# Patient Record
Sex: MALE | Race: OTHER | URBAN - METROPOLITAN AREA
[De-identification: names, ages, dates, MRNs, and addresses within clinical notes are randomized per-mention and may not be internally consistent; named-entity substitution may affect disease eponyms.]

---

## 2020-12-09 ENCOUNTER — OFFICE VISIT (OUTPATIENT)
Dept: URBAN - METROPOLITAN AREA CLINIC 115 | Facility: CLINIC | Age: 80
End: 2020-12-09
Payer: COMMERCIAL

## 2020-12-09 ENCOUNTER — LAB OUTSIDE AN ENCOUNTER (OUTPATIENT)
Dept: URBAN - METROPOLITAN AREA CLINIC 115 | Facility: CLINIC | Age: 80
End: 2020-12-09

## 2020-12-09 ENCOUNTER — TELEPHONE ENCOUNTER (OUTPATIENT)
Dept: URBAN - METROPOLITAN AREA CLINIC 115 | Facility: CLINIC | Age: 80
End: 2020-12-09

## 2020-12-09 DIAGNOSIS — R14.2 BELCHING: ICD-10-CM

## 2020-12-09 DIAGNOSIS — R10.11 RUQ PAIN: ICD-10-CM

## 2020-12-09 DIAGNOSIS — R10.13 DYSPEPSIA: ICD-10-CM

## 2020-12-09 PROCEDURE — G8427 DOCREV CUR MEDS BY ELIG CLIN: HCPCS | Performed by: INTERNAL MEDICINE

## 2020-12-09 PROCEDURE — G8482 FLU IMMUNIZE ORDER/ADMIN: HCPCS | Performed by: INTERNAL MEDICINE

## 2020-12-09 PROCEDURE — G8420 CALC BMI NORM PARAMETERS: HCPCS | Performed by: INTERNAL MEDICINE

## 2020-12-09 PROCEDURE — 99203 OFFICE O/P NEW LOW 30 MIN: CPT | Performed by: INTERNAL MEDICINE

## 2020-12-09 PROCEDURE — 4004F PT TOBACCO SCREEN RCVD TLK: CPT | Performed by: INTERNAL MEDICINE

## 2020-12-09 RX ORDER — ATORVASTATIN CALCIUM 10 MG/1
1 TABLET TABLET, FILM COATED ORAL ONCE A DAY
Status: ACTIVE | COMMUNITY

## 2020-12-09 RX ORDER — ASCORBIC ACID 100 MG
1 TABLET TABLET,CHEWABLE ORAL ONCE A DAY
Status: ACTIVE | COMMUNITY

## 2020-12-09 RX ORDER — LOSARTAN POTASSIUM 100 MG/1
1 TABLET TABLET ORAL ONCE A DAY
Status: ACTIVE | COMMUNITY

## 2020-12-09 RX ORDER — AMLODIPINE BESYLATE 5 MG/1
1 TABLET TABLET ORAL ONCE A DAY
Status: ACTIVE | COMMUNITY

## 2020-12-09 RX ORDER — PANTOPRAZOLE SODIUM 20 MG/1
1 TABLET TABLET, DELAYED RELEASE ORAL ONCE A DAY
Status: ACTIVE | COMMUNITY

## 2020-12-09 NOTE — HPI-TODAY'S VISIT:
81 yo AAM here for c/o RUQ and chest pain and discomfort. He had these symptoms going off an on for 1 year. It is intermittent and no obvious triggers or relieving factors. Takes tylenol for pain. He c/o severe belching and burping. PCP has rx pantoprazole and it has helped partially but did not continue after 2 weeks as he had concerns for side effects.  last colonsocopy was 5 years ago and it was unremarkable.

## 2020-12-14 ENCOUNTER — CLAIMS CREATED FROM THE CLAIM WINDOW (OUTPATIENT)
Dept: URBAN - METROPOLITAN AREA CLINIC 4 | Facility: CLINIC | Age: 80
End: 2020-12-14
Payer: COMMERCIAL

## 2020-12-14 ENCOUNTER — OFFICE VISIT (OUTPATIENT)
Dept: URBAN - METROPOLITAN AREA SURGERY CENTER 13 | Facility: SURGERY CENTER | Age: 80
End: 2020-12-14
Payer: COMMERCIAL

## 2020-12-14 ENCOUNTER — TELEPHONE ENCOUNTER (OUTPATIENT)
Dept: URBAN - METROPOLITAN AREA CLINIC 92 | Facility: CLINIC | Age: 80
End: 2020-12-14

## 2020-12-14 DIAGNOSIS — K29.60 OTHER GASTRITIS WITHOUT BLEEDING: ICD-10-CM

## 2020-12-14 DIAGNOSIS — K30 ACID INDIGESTION: ICD-10-CM

## 2020-12-14 DIAGNOSIS — K29.60 ADENOPAPILLOMATOSIS GASTRICA: ICD-10-CM

## 2020-12-14 DIAGNOSIS — B96.81 BACTERIAL INFECTION DUE TO H. PYLORI: ICD-10-CM

## 2020-12-14 DIAGNOSIS — B96.81 HELICOBACTER PYLORI [H. PYLORI] AS THE CAUSE OF DISEASES CLASSIFIED ELSEWHERE: ICD-10-CM

## 2020-12-14 PROCEDURE — 88305 TISSUE EXAM BY PATHOLOGIST: CPT | Performed by: PATHOLOGY

## 2020-12-14 PROCEDURE — 88342 IMHCHEM/IMCYTCHM 1ST ANTB: CPT | Performed by: PATHOLOGY

## 2020-12-14 PROCEDURE — G8907 PT DOC NO EVENTS ON DISCHARG: HCPCS | Performed by: INTERNAL MEDICINE

## 2020-12-14 PROCEDURE — 43239 EGD BIOPSY SINGLE/MULTIPLE: CPT | Performed by: INTERNAL MEDICINE

## 2020-12-14 RX ORDER — ATORVASTATIN CALCIUM 10 MG/1
1 TABLET TABLET, FILM COATED ORAL ONCE A DAY
Status: ACTIVE | COMMUNITY

## 2020-12-14 RX ORDER — ASCORBIC ACID 100 MG
1 TABLET TABLET,CHEWABLE ORAL ONCE A DAY
Status: ACTIVE | COMMUNITY

## 2020-12-14 RX ORDER — PANTOPRAZOLE SODIUM 20 MG/1
1 TABLET TABLET, DELAYED RELEASE ORAL ONCE A DAY
Status: ACTIVE | COMMUNITY

## 2020-12-14 RX ORDER — LOSARTAN POTASSIUM 100 MG/1
1 TABLET TABLET ORAL ONCE A DAY
Status: ACTIVE | COMMUNITY

## 2020-12-14 RX ORDER — PANTOPRAZOLE 40 MG/1
1 TABLET TABLET, DELAYED RELEASE ORAL ONCE A DAY
Qty: 90 TABLET | Refills: 1 | OUTPATIENT
Start: 2020-12-14

## 2020-12-14 RX ORDER — AMLODIPINE BESYLATE 5 MG/1
1 TABLET TABLET ORAL ONCE A DAY
Status: ACTIVE | COMMUNITY

## 2020-12-21 ENCOUNTER — TELEPHONE ENCOUNTER (OUTPATIENT)
Dept: URBAN - METROPOLITAN AREA CLINIC 92 | Facility: CLINIC | Age: 80
End: 2020-12-21

## 2020-12-21 RX ORDER — CLARITHROMYCIN 500 MG/1
1 TABLET,HOLD STATINS FOR 2 WEEKS TABLET, FILM COATED ORAL
Qty: 28 TABLET | Refills: 0 | OUTPATIENT
Start: 2020-12-21 | End: 2021-01-04

## 2020-12-21 RX ORDER — AMOXICILLIN 500 MG/1
2 CAPSULES CAPSULE ORAL TWICE A DAY
Qty: 56 CAPSULE | Refills: 0 | OUTPATIENT
Start: 2020-12-21 | End: 2021-01-04

## 2021-05-19 ENCOUNTER — OFFICE VISIT (OUTPATIENT)
Dept: URBAN - METROPOLITAN AREA CLINIC 115 | Facility: CLINIC | Age: 81
End: 2021-05-19
Payer: COMMERCIAL

## 2021-05-19 ENCOUNTER — WEB ENCOUNTER (OUTPATIENT)
Dept: URBAN - METROPOLITAN AREA CLINIC 115 | Facility: CLINIC | Age: 81
End: 2021-05-19

## 2021-05-19 ENCOUNTER — LAB OUTSIDE AN ENCOUNTER (OUTPATIENT)
Dept: URBAN - METROPOLITAN AREA CLINIC 115 | Facility: CLINIC | Age: 81
End: 2021-05-19

## 2021-05-19 DIAGNOSIS — D50.0 IRON DEFICIENCY ANEMIA DUE TO CHRONIC BLOOD LOSS: ICD-10-CM

## 2021-05-19 DIAGNOSIS — D13.5 ADENOMYOMATOSIS OF GALLBLADDER: ICD-10-CM

## 2021-05-19 DIAGNOSIS — R10.11 RUQ PAIN: ICD-10-CM

## 2021-05-19 DIAGNOSIS — K86.1 CHRONIC PANCREATITIS, UNSPECIFIED PANCREATITIS TYPE: ICD-10-CM

## 2021-05-19 PROBLEM — 235494005: Status: ACTIVE | Noted: 2021-05-19

## 2021-05-19 PROBLEM — 162031009: Status: ACTIVE | Noted: 2020-12-09

## 2021-05-19 PROBLEM — 80774000 HELICOBACTER PYLORI: Status: ACTIVE | Noted: 2021-05-19

## 2021-05-19 PROCEDURE — 99214 OFFICE O/P EST MOD 30 MIN: CPT | Performed by: INTERNAL MEDICINE

## 2021-05-19 RX ORDER — ASCORBIC ACID 100 MG
1 TABLET TABLET,CHEWABLE ORAL ONCE A DAY
Status: ACTIVE | COMMUNITY

## 2021-05-19 RX ORDER — PANTOPRAZOLE 40 MG/1
1 TABLET TABLET, DELAYED RELEASE ORAL ONCE A DAY
Qty: 90 TABLET | Refills: 1 | Status: ACTIVE | COMMUNITY
Start: 2020-12-14

## 2021-05-19 RX ORDER — AMLODIPINE BESYLATE 5 MG/1
1 TABLET TABLET ORAL ONCE A DAY
Status: ACTIVE | COMMUNITY

## 2021-05-19 RX ORDER — PANTOPRAZOLE SODIUM 20 MG/1
1 TABLET TABLET, DELAYED RELEASE ORAL ONCE A DAY
Status: ACTIVE | COMMUNITY

## 2021-05-19 RX ORDER — POLYETHYLENE GLYCOL 3350 17 G/17G
AS DIRECTED POWDER, FOR SOLUTION ORAL
Qty: 238 GRAM | Refills: 0 | OUTPATIENT
Start: 2021-05-19 | End: 2021-05-20

## 2021-05-19 RX ORDER — ATORVASTATIN CALCIUM 10 MG/1
1 TABLET TABLET, FILM COATED ORAL ONCE A DAY
Status: ACTIVE | COMMUNITY

## 2021-05-19 RX ORDER — LOSARTAN POTASSIUM 100 MG/1
1 TABLET TABLET ORAL ONCE A DAY
Status: ACTIVE | COMMUNITY

## 2021-05-19 NOTE — HPI-TODAY'S VISIT:
79 yo AAM here for c/o RUQ and chest pain and discomfort. He had these symptoms going off an on for 1 year. It is intermittent and no obvious triggers or relieving factors. Takes tylenol for pain. He c/o severe belching and burping. PCP has rx pantoprazole and it has helped partially but did not continue after 2 weeks as he had concerns for side effects.  last colonsocopy was 5 years ago and it was unremarkable.  5/19/21:

## 2021-05-19 NOTE — HPI-TODAY'S VISIT:
is here for follow-up.  He is complaining of having right upper quadrant pain and right-sided chest pain which he always have.  Other complaints also includes nausea..  Patient stated his symptoms of nausea are improving since he took PPI.  In December he underwent an upper endoscopy which showed H. pylori erosive gastritis.  And also mild reflux.  Patient had a recent labs showing anemia hemoglobin around 12.  Lites LFTs and lipase was also checked showing elevated lipase.  Patient reports having prior history of elevated lipase in the past and also had a CT scan of the abdomen and ultrasound of the abdomen last year which were reviewed today which showed adenomatosis of the gallbladder wall thickening of the gallbladder wall.  Patient reports having some weight loss.  Denies any diarrhea rectal bleeding or melanotic stools.  He quit taking pantoprazole after 1month.  However he reports being compliant with the PPI and antibiotics at that time.

## 2021-05-20 PROBLEM — 4556007 GASTRITIS: Status: ACTIVE | Noted: 2021-05-20

## 2021-05-22 LAB
A/G RATIO: 1.4
ALBUMIN: 4.2
ALKALINE PHOSPHATASE: 42
ALT (SGPT): 10
AST (SGOT): 16
BILIRUBIN, TOTAL: 0.9
BUN/CREATININE RATIO: (no result)
BUN: 17
CALCIUM: 9.3
CARBON DIOXIDE, TOTAL: 28
CHLORIDE: 105
CREATININE: 1.07
EGFR AFRICAN AMERICAN: 75
EGFR NON-AFR. AMERICAN: 65
GLOBULIN, TOTAL: 3
GLUCOSE: 107
IRON BIND.CAP.(TIBC): 276
IRON SATURATION: 36
IRON: 100
LIPASE: 134
POTASSIUM: 4.2
PROTEIN, TOTAL: 7.2
SODIUM: 141

## 2021-05-28 ENCOUNTER — TELEPHONE ENCOUNTER (OUTPATIENT)
Dept: URBAN - METROPOLITAN AREA CLINIC 92 | Facility: CLINIC | Age: 81
End: 2021-05-28

## 2021-06-03 ENCOUNTER — LAB OUTSIDE AN ENCOUNTER (OUTPATIENT)
Dept: URBAN - METROPOLITAN AREA CLINIC 82 | Facility: CLINIC | Age: 81
End: 2021-06-03

## 2021-06-03 LAB
CREATININE POC: 1.2
PERFORMING LAB: (no result)

## 2021-07-06 ENCOUNTER — OFFICE VISIT (OUTPATIENT)
Dept: URBAN - METROPOLITAN AREA SURGERY CENTER 13 | Facility: SURGERY CENTER | Age: 81
End: 2021-07-06

## 2021-07-06 PROBLEM — 1086791000119100: Status: ACTIVE | Noted: 2021-05-19

## 2021-08-09 ENCOUNTER — OFFICE VISIT (OUTPATIENT)
Dept: URBAN - METROPOLITAN AREA SURGERY CENTER 13 | Facility: SURGERY CENTER | Age: 81
End: 2021-08-09
Payer: COMMERCIAL

## 2021-08-09 ENCOUNTER — TELEPHONE ENCOUNTER (OUTPATIENT)
Dept: URBAN - METROPOLITAN AREA CLINIC 92 | Facility: CLINIC | Age: 81
End: 2021-08-09

## 2021-08-09 ENCOUNTER — LAB OUTSIDE AN ENCOUNTER (OUTPATIENT)
Dept: URBAN - METROPOLITAN AREA CLINIC 92 | Facility: CLINIC | Age: 81
End: 2021-08-09

## 2021-08-09 ENCOUNTER — OFFICE VISIT (OUTPATIENT)
Dept: URBAN - METROPOLITAN AREA SURGERY CENTER 13 | Facility: SURGERY CENTER | Age: 81
End: 2021-08-09

## 2021-08-09 ENCOUNTER — CLAIMS CREATED FROM THE CLAIM WINDOW (OUTPATIENT)
Dept: URBAN - METROPOLITAN AREA CLINIC 4 | Facility: CLINIC | Age: 81
End: 2021-08-09
Payer: COMMERCIAL

## 2021-08-09 DIAGNOSIS — D50.9 ANEMIA, IRON DEFICIENCY: ICD-10-CM

## 2021-08-09 DIAGNOSIS — K63.89 COLONIC PSEUDOMELANOSIS: ICD-10-CM

## 2021-08-09 DIAGNOSIS — K63.5 BENIGN COLON POLYP: ICD-10-CM

## 2021-08-09 DIAGNOSIS — K31.89 ACQUIRED DEFORMITY OF DUODENUM: ICD-10-CM

## 2021-08-09 DIAGNOSIS — K31.89 GASTRIC FOVEOLAR HYPERPLASIA: ICD-10-CM

## 2021-08-09 PROBLEM — 724556004: Status: ACTIVE | Noted: 2021-05-19

## 2021-08-09 PROCEDURE — 43239 EGD BIOPSY SINGLE/MULTIPLE: CPT | Performed by: INTERNAL MEDICINE

## 2021-08-09 PROCEDURE — 88312 SPECIAL STAINS GROUP 1: CPT | Performed by: PATHOLOGY

## 2021-08-09 PROCEDURE — 88305 TISSUE EXAM BY PATHOLOGIST: CPT | Performed by: PATHOLOGY

## 2021-08-09 PROCEDURE — G8907 PT DOC NO EVENTS ON DISCHARG: HCPCS | Performed by: INTERNAL MEDICINE

## 2021-08-09 PROCEDURE — 45380 COLONOSCOPY AND BIOPSY: CPT | Performed by: INTERNAL MEDICINE

## 2021-08-09 RX ORDER — AMLODIPINE BESYLATE 5 MG/1
1 TABLET TABLET ORAL ONCE A DAY
Status: ACTIVE | COMMUNITY

## 2021-08-09 RX ORDER — LOSARTAN POTASSIUM 100 MG/1
1 TABLET TABLET ORAL ONCE A DAY
Status: ACTIVE | COMMUNITY

## 2021-08-09 RX ORDER — ASCORBIC ACID 100 MG
1 TABLET TABLET,CHEWABLE ORAL ONCE A DAY
Status: ACTIVE | COMMUNITY

## 2021-08-09 RX ORDER — ATORVASTATIN CALCIUM 10 MG/1
1 TABLET TABLET, FILM COATED ORAL ONCE A DAY
Status: ACTIVE | COMMUNITY

## 2021-08-09 RX ORDER — PANTOPRAZOLE 40 MG/1
1 TABLET TABLET, DELAYED RELEASE ORAL ONCE A DAY
Qty: 90 TABLET | Refills: 1 | Status: ACTIVE | COMMUNITY
Start: 2020-12-14

## 2021-08-09 RX ORDER — PANTOPRAZOLE SODIUM 20 MG/1
1 TABLET TABLET, DELAYED RELEASE ORAL ONCE A DAY
Status: ACTIVE | COMMUNITY

## 2022-05-04 ENCOUNTER — OFFICE VISIT (OUTPATIENT)
Dept: URBAN - METROPOLITAN AREA CLINIC 115 | Facility: CLINIC | Age: 82
End: 2022-05-04

## 2022-06-15 ENCOUNTER — OFFICE VISIT (OUTPATIENT)
Dept: URBAN - METROPOLITAN AREA CLINIC 115 | Facility: CLINIC | Age: 82
End: 2022-06-15

## 2022-06-15 ENCOUNTER — OFFICE VISIT (OUTPATIENT)
Dept: URBAN - METROPOLITAN AREA CLINIC 115 | Facility: CLINIC | Age: 82
End: 2022-06-15
Payer: COMMERCIAL

## 2022-06-15 ENCOUNTER — LAB OUTSIDE AN ENCOUNTER (OUTPATIENT)
Dept: URBAN - METROPOLITAN AREA CLINIC 115 | Facility: CLINIC | Age: 82
End: 2022-06-15

## 2022-06-15 VITALS
BODY MASS INDEX: 24.75 KG/M2 | SYSTOLIC BLOOD PRESSURE: 119 MMHG | DIASTOLIC BLOOD PRESSURE: 70 MMHG | HEART RATE: 82 BPM | WEIGHT: 145 LBS | TEMPERATURE: 97.1 F | HEIGHT: 64 IN

## 2022-06-15 DIAGNOSIS — D13.5 ADENOMYOMATOSIS OF GALLBLADDER: ICD-10-CM

## 2022-06-15 DIAGNOSIS — R74.8 ELEVATED LIPASE: ICD-10-CM

## 2022-06-15 DIAGNOSIS — R14.2 BELCHING: ICD-10-CM

## 2022-06-15 PROBLEM — 721723003: Status: ACTIVE | Noted: 2021-05-19

## 2022-06-15 PROBLEM — 271834000: Status: ACTIVE | Noted: 2020-12-09

## 2022-06-15 PROCEDURE — 99214 OFFICE O/P EST MOD 30 MIN: CPT | Performed by: INTERNAL MEDICINE

## 2022-06-15 RX ORDER — ATORVASTATIN CALCIUM 10 MG/1
1 TABLET TABLET, FILM COATED ORAL ONCE A DAY
Status: ACTIVE | COMMUNITY

## 2022-06-15 RX ORDER — PANTOPRAZOLE SODIUM 20 MG/1
1 TABLET TABLET, DELAYED RELEASE ORAL ONCE A DAY
Status: ACTIVE | COMMUNITY

## 2022-06-15 RX ORDER — AMLODIPINE BESYLATE 5 MG/1
1 TABLET TABLET ORAL ONCE A DAY
Status: ACTIVE | COMMUNITY

## 2022-06-15 RX ORDER — PANTOPRAZOLE 40 MG/1
1 TABLET TABLET, DELAYED RELEASE ORAL ONCE A DAY
Qty: 90 TABLET | Refills: 1 | Status: ACTIVE | COMMUNITY
Start: 2020-12-14

## 2022-06-15 RX ORDER — LOSARTAN POTASSIUM 100 MG/1
1 TABLET TABLET ORAL ONCE A DAY
Status: ACTIVE | COMMUNITY

## 2022-06-15 RX ORDER — ASCORBIC ACID 100 MG
1 TABLET TABLET,CHEWABLE ORAL ONCE A DAY
Status: ACTIVE | COMMUNITY

## 2022-06-15 NOTE — HPI-TODAY'S VISIT:
is here for follow-up.  He is complaining of having right upper quadrant pain and right-sided chest pain which he always have.  Other complaints also includes nausea..  Patient stated his symptoms of nausea are improving since he took PPI.  In December he underwent an upper endoscopy which showed H. pylori erosive gastritis.  And also mild reflux.  Patient had a recent labs showing anemia hemoglobin around 12.  Lites LFTs and lipase was also checked showing elevated lipase.  Patient reports having prior history of elevated lipase in the past and also had a CT scan of the abdomen and ultrasound of the abdomen last year which were reviewed today which showed adenomatosis of the gallbladder wall thickening of the gallbladder wall.  Patient reports having some weight loss.  Denies any diarrhea rectal bleeding or melanotic stools.  He quit taking pantoprazole after 1month.  However he reports being compliant with the PPI and antibiotics at that time.  6/15/22: Mr. Brennan was seen today for follow-up.  He was sent back by his PCP given his elevated lipase.  Patient is known to have elevated lipase in the range of 101" 134 several times.  Patient denies any epigastric abdominal pain nausea vomiting.  He had an MRI of the abdomen done last year because of the slight elevation of the lipase and also because of the vague epigastric abdominal discomfort and pain.  Bili was unremarkable normal pancreatic lesions were noted no evidence of bile duct lesions no evidence of stones in the common bile duct.  He was noted to have fatty liver.  He has unintentional weight loss about 45 pounds in the past year.  Prior endoscopy and colonoscopy was reviewed.  Patient denies any new medications.  CT scan of the abdomen pelvis from June 2020 was also reviewed.

## 2022-06-15 NOTE — HPI-TODAY'S VISIT:
81 yo AAM here for c/o RUQ and chest pain and discomfort. He had these symptoms going off an on for 1 year. It is intermittent and no obvious triggers or relieving factors. Takes tylenol for pain. He c/o severe belching and burping. PCP has rx pantoprazole and it has helped partially but did not continue after 2 weeks as he had concerns for side effects.  last colonsocopy was 5 years ago and it was unremarkable.  5/19/21:

## 2023-11-07 ENCOUNTER — OFFICE VISIT (OUTPATIENT)
Dept: URBAN - METROPOLITAN AREA CLINIC 115 | Facility: CLINIC | Age: 83
End: 2023-11-07
Payer: COMMERCIAL

## 2023-11-07 VITALS
TEMPERATURE: 98.4 F | SYSTOLIC BLOOD PRESSURE: 128 MMHG | DIASTOLIC BLOOD PRESSURE: 78 MMHG | BODY MASS INDEX: 24.34 KG/M2 | WEIGHT: 142.6 LBS | HEIGHT: 64 IN | HEART RATE: 98 BPM

## 2023-11-07 DIAGNOSIS — D64.89 ANEMIA DUE TO OTHER CAUSE: ICD-10-CM

## 2023-11-07 DIAGNOSIS — K31.9 GASTROPATHY: ICD-10-CM

## 2023-11-07 DIAGNOSIS — R74.8 ELEVATED LIPASE: ICD-10-CM

## 2023-11-07 DIAGNOSIS — R17 ELEVATED BILIRUBIN: ICD-10-CM

## 2023-11-07 PROCEDURE — 99213 OFFICE O/P EST LOW 20 MIN: CPT | Performed by: INTERNAL MEDICINE

## 2023-11-07 RX ORDER — ASCORBIC ACID 100 MG
1 TABLET TABLET,CHEWABLE ORAL ONCE A DAY
Status: ACTIVE | COMMUNITY

## 2023-11-07 RX ORDER — ATORVASTATIN CALCIUM 10 MG/1
1 TABLET TABLET, FILM COATED ORAL ONCE A DAY
Status: ACTIVE | COMMUNITY

## 2023-11-07 RX ORDER — AMLODIPINE BESYLATE 5 MG/1
1 TABLET TABLET ORAL ONCE A DAY
Status: ACTIVE | COMMUNITY

## 2023-11-07 RX ORDER — FAMOTIDINE 40 MG/1
1 TABLET AT BEDTIME TABLET, FILM COATED ORAL ONCE A DAY
Qty: 30 | Refills: 1 | OUTPATIENT
Start: 2023-11-07

## 2023-11-07 RX ORDER — PANTOPRAZOLE SODIUM 20 MG/1
1 TABLET TABLET, DELAYED RELEASE ORAL ONCE A DAY
Status: ACTIVE | COMMUNITY

## 2023-11-07 RX ORDER — PANTOPRAZOLE 40 MG/1
1 TABLET TABLET, DELAYED RELEASE ORAL ONCE A DAY
Qty: 90 TABLET | Refills: 1 | Status: ACTIVE | COMMUNITY
Start: 2020-12-14

## 2023-11-07 RX ORDER — LOSARTAN POTASSIUM 100 MG/1
1 TABLET TABLET ORAL ONCE A DAY
Status: ACTIVE | COMMUNITY

## 2023-11-07 NOTE — HPI-TODAY'S VISIT:
MRI/MRCP '22 w normal liver, bile ducts, pancreas, and GB adenomyomatosis. Pt of Dr. Saavedra. EGD '21 w gastric erosions, gastropathy, no hp. reports hx chronic elevated lipase.bili 1.4, lipase 115, hgb 11.9, mcv 91. No epig pain. Lost ~5lb. concerned re labs.

## 2023-11-08 LAB
ALBUMIN/GLOBULIN RATIO: 1.2
ALBUMIN: 4.3
ALKALINE PHOSPHATASE: 87
ALT (SGPT): 23
AST (SGOT): 29
BILIRUBIN, DIRECT: 0.4
BILIRUBIN, INDIRECT: 0.9
BILIRUBIN, TOTAL: 1.3
BUN/CREATININE RATIO: (no result)
CALCIUM: 9.9
CARBON DIOXIDE: 29
CHLORIDE: 104
CREATININE: 1.11
EGFR: 66
FERRITIN, SERUM: 186
FOLATE (FOLIC ACID), SERUM: 12.9
GLOBULIN: 3.6
GLUCOSE: 99
HEMATOCRIT: 37
HEMOGLOBIN: 12.3
IMMUNOGLOBULIN A, QN, SERUM: 447
INTERPRETATION: (no result)
IRON BIND.CAP.(TIBC): 345
IRON SATURATION: 17
IRON: 57
LIPASE: 87
MCH: 29.4
MCHC: 33.2
MCV: 88.3
MPV: 11
PLATELET COUNT: 166
POTASSIUM: 4.4
PROTEIN, TOTAL: 7.9
RDW: 11.7
RED BLOOD CELL COUNT: 4.19
SODIUM: 140
T-TRANSGLUTAMINASE (TTG) IGA: 5.1
UREA NITROGEN (BUN): 18
VITAMIN B12: 477
WHITE BLOOD CELL COUNT: 4.2

## 2023-11-30 ENCOUNTER — ERX REFILL RESPONSE (OUTPATIENT)
Dept: URBAN - METROPOLITAN AREA CLINIC 115 | Facility: CLINIC | Age: 83
End: 2023-11-30

## 2023-11-30 RX ORDER — FAMOTIDINE 40 MG/1
1 TABLET AT BEDTIME TABLET, FILM COATED ORAL ONCE A DAY
Qty: 30 | Refills: 1 | OUTPATIENT

## 2023-11-30 RX ORDER — FAMOTIDINE 40 MG/1
TAKE 1 TABLET BY MOUTH EVERY DAY AT BEDTIME FOR 30 DAYS TABLET, FILM COATED ORAL
Qty: 90 TABLET | Refills: 1 | OUTPATIENT

## 2023-12-05 ENCOUNTER — LAB OUTSIDE AN ENCOUNTER (OUTPATIENT)
Dept: URBAN - METROPOLITAN AREA CLINIC 115 | Facility: CLINIC | Age: 83
End: 2023-12-05

## 2023-12-05 ENCOUNTER — OFFICE VISIT (OUTPATIENT)
Dept: URBAN - METROPOLITAN AREA CLINIC 115 | Facility: CLINIC | Age: 83
End: 2023-12-05
Payer: COMMERCIAL

## 2023-12-05 VITALS
WEIGHT: 146.2 LBS | SYSTOLIC BLOOD PRESSURE: 129 MMHG | BODY MASS INDEX: 24.96 KG/M2 | DIASTOLIC BLOOD PRESSURE: 73 MMHG | HEIGHT: 64 IN | HEART RATE: 101 BPM | TEMPERATURE: 99.4 F

## 2023-12-05 DIAGNOSIS — K31.9 GASTROPATHY: ICD-10-CM

## 2023-12-05 DIAGNOSIS — D50.0 IRON DEFICIENCY ANEMIA DUE TO CHRONIC BLOOD LOSS: ICD-10-CM

## 2023-12-05 DIAGNOSIS — R74.8 ELEVATED LIPASE: ICD-10-CM

## 2023-12-05 DIAGNOSIS — R54 ADVANCED AGE: ICD-10-CM

## 2023-12-05 DIAGNOSIS — R14.2 BELCHING: ICD-10-CM

## 2023-12-05 PROBLEM — 49808004: Status: ACTIVE | Noted: 2023-12-05

## 2023-12-05 PROCEDURE — 99214 OFFICE O/P EST MOD 30 MIN: CPT | Performed by: INTERNAL MEDICINE

## 2023-12-05 RX ORDER — ATORVASTATIN CALCIUM 10 MG/1
1 TABLET TABLET, FILM COATED ORAL ONCE A DAY
Status: ACTIVE | COMMUNITY

## 2023-12-05 RX ORDER — FAMOTIDINE 40 MG/1
1 TAB TWICE DAILY TABLET, FILM COATED ORAL
Qty: 180 | Refills: 0

## 2023-12-05 RX ORDER — PANTOPRAZOLE 40 MG/1
1 TABLET TABLET, DELAYED RELEASE ORAL ONCE A DAY
Qty: 90 TABLET | Refills: 1 | Status: ACTIVE | COMMUNITY
Start: 2020-12-14

## 2023-12-05 RX ORDER — PANTOPRAZOLE SODIUM 20 MG/1
1 TABLET TABLET, DELAYED RELEASE ORAL ONCE A DAY
Status: ACTIVE | COMMUNITY

## 2023-12-05 RX ORDER — FAMOTIDINE 40 MG/1
TAKE 1 TABLET BY MOUTH EVERY DAY AT BEDTIME FOR 30 DAYS TABLET, FILM COATED ORAL
Qty: 90 TABLET | Refills: 1 | Status: ACTIVE | COMMUNITY

## 2023-12-05 RX ORDER — ASCORBIC ACID 100 MG
1 TABLET TABLET,CHEWABLE ORAL ONCE A DAY
Status: ACTIVE | COMMUNITY

## 2023-12-05 RX ORDER — AMLODIPINE BESYLATE 5 MG/1
1 TABLET TABLET ORAL ONCE A DAY
Status: ACTIVE | COMMUNITY

## 2023-12-05 RX ORDER — LOSARTAN POTASSIUM 100 MG/1
1 TABLET TABLET ORAL ONCE A DAY
Status: ACTIVE | COMMUNITY

## 2023-12-05 NOTE — HPI-TODAY'S VISIT:
Bleching, on pepcid daily. No abd pain. Labs reviewed, low iron, slight anemia, take otc iron tab once daily for 2 months then recheck labs. slight elevation in pancreas enzyme lipase. MRI/MRCP '22 w normal liver, bile ducts, pancreas, and GB adenomyomatosis. Pt of Dr. Saavedra. Prior hx: EGD '21 w gastric erosions, gastropathy, no hp. reports hx chronic elevated lipase.bili 1.4, lipase 115, hgb 11.9, mcv 91. No epig pain. Lost ~5lb. concerned re labs.

## 2023-12-06 LAB
A/G RATIO: 1.3
ALBUMIN: 4.1
ALKALINE PHOSPHATASE: 90
ALT (SGPT): 22
AST (SGOT): 28
BILIRUBIN, TOTAL: 1.1
BUN/CREATININE RATIO: (no result)
BUN: 19
CALCIUM: 9.4
CARBON DIOXIDE, TOTAL: 26
CHLORIDE: 105
CREATININE: 1.17
EGFR: 61
FERRITIN, SERUM: 156
GLOBULIN, TOTAL: 3.2
GLUCOSE: 188
HEMATOCRIT: 37.5
HEMOGLOBIN: 12.1
IRON BIND.CAP.(TIBC): 289
IRON SATURATION: 15
IRON: 43
LIPASE: 86
MCH: 29.7
MCHC: 32.3
MCV: 91.9
MPV: 11.5
PLATELET COUNT: 130
POTASSIUM: 4.2
PROTEIN, TOTAL: 7.3
RDW: 11.8
RED BLOOD CELL COUNT: 4.08
SODIUM: 142
WHITE BLOOD CELL COUNT: 3.4

## 2023-12-08 ENCOUNTER — TELEPHONE ENCOUNTER (OUTPATIENT)
Dept: URBAN - METROPOLITAN AREA CLINIC 115 | Facility: CLINIC | Age: 83
End: 2023-12-08

## 2023-12-11 ENCOUNTER — WEB ENCOUNTER (OUTPATIENT)
Dept: URBAN - METROPOLITAN AREA CLINIC 92 | Facility: CLINIC | Age: 83
End: 2023-12-11

## 2024-01-11 ENCOUNTER — OFFICE VISIT (OUTPATIENT)
Dept: URBAN - METROPOLITAN AREA SURGERY CENTER 13 | Facility: SURGERY CENTER | Age: 84
End: 2024-01-11

## 2024-01-12 ENCOUNTER — CLAIMS CREATED FROM THE CLAIM WINDOW (OUTPATIENT)
Dept: URBAN - METROPOLITAN AREA CLINIC 4 | Facility: CLINIC | Age: 84
End: 2024-01-12
Payer: COMMERCIAL

## 2024-01-12 ENCOUNTER — OUT OF OFFICE VISIT (OUTPATIENT)
Dept: URBAN - METROPOLITAN AREA SURGERY CENTER 13 | Facility: SURGERY CENTER | Age: 84
End: 2024-01-12
Payer: COMMERCIAL

## 2024-01-12 DIAGNOSIS — K29.80 DUODENITIS: ICD-10-CM

## 2024-01-12 DIAGNOSIS — K29.81 DUODENITIS WITH BLEEDING: ICD-10-CM

## 2024-01-12 DIAGNOSIS — K29.90 GASTRITIS AND DUODENITIS: ICD-10-CM

## 2024-01-12 DIAGNOSIS — K29.70 GASTRITIS: ICD-10-CM

## 2024-01-12 DIAGNOSIS — R10.13 EPIGASTRIC PAIN: ICD-10-CM

## 2024-01-12 DIAGNOSIS — R74.8 ABNORMAL LEVELS OF OTHER SERUM ENZYMES: ICD-10-CM

## 2024-01-12 PROCEDURE — 88342 IMHCHEM/IMCYTCHM 1ST ANTB: CPT | Performed by: PATHOLOGY

## 2024-01-12 PROCEDURE — 43239 EGD BIOPSY SINGLE/MULTIPLE: CPT | Performed by: INTERNAL MEDICINE

## 2024-01-12 PROCEDURE — 88305 TISSUE EXAM BY PATHOLOGIST: CPT | Performed by: PATHOLOGY

## 2024-01-12 PROCEDURE — 00731 ANES UPR GI NDSC PX NOS: CPT | Performed by: ANESTHESIOLOGY

## 2024-01-12 PROCEDURE — G8907 PT DOC NO EVENTS ON DISCHARG: HCPCS | Performed by: INTERNAL MEDICINE

## 2024-01-12 PROCEDURE — 00731 ANES UPR GI NDSC PX NOS: CPT | Performed by: ANESTHESIOLOGIST ASSISTANT

## 2024-01-12 RX ORDER — PANTOPRAZOLE 40 MG/1
1 TABLET TABLET, DELAYED RELEASE ORAL TWICE A DAY
Qty: 60 TABLET | Refills: 1
Start: 2020-12-14

## 2024-01-12 RX ORDER — ATORVASTATIN CALCIUM 10 MG/1
1 TABLET TABLET, FILM COATED ORAL ONCE A DAY
Status: ACTIVE | COMMUNITY

## 2024-01-12 RX ORDER — PANTOPRAZOLE SODIUM 20 MG/1
1 TABLET TABLET, DELAYED RELEASE ORAL ONCE A DAY
Status: ACTIVE | COMMUNITY

## 2024-01-12 RX ORDER — AMLODIPINE BESYLATE 5 MG/1
1 TABLET TABLET ORAL ONCE A DAY
Status: ACTIVE | COMMUNITY

## 2024-01-12 RX ORDER — PANTOPRAZOLE 40 MG/1
1 TABLET TABLET, DELAYED RELEASE ORAL ONCE A DAY
Qty: 90 TABLET | Refills: 1 | Status: ACTIVE | COMMUNITY
Start: 2020-12-14

## 2024-01-12 RX ORDER — FAMOTIDINE 40 MG/1
1 TAB TWICE DAILY TABLET, FILM COATED ORAL
Qty: 180 | Refills: 0 | Status: ACTIVE | COMMUNITY

## 2024-01-12 RX ORDER — ASCORBIC ACID 100 MG
1 TABLET TABLET,CHEWABLE ORAL ONCE A DAY
Status: ACTIVE | COMMUNITY

## 2024-01-12 RX ORDER — LOSARTAN POTASSIUM 100 MG/1
1 TABLET TABLET ORAL ONCE A DAY
Status: ACTIVE | COMMUNITY

## 2024-01-18 ENCOUNTER — OFFICE VISIT (OUTPATIENT)
Dept: URBAN - METROPOLITAN AREA SURGERY CENTER 13 | Facility: SURGERY CENTER | Age: 84
End: 2024-01-18

## 2024-02-06 ENCOUNTER — OV EP (OUTPATIENT)
Dept: URBAN - METROPOLITAN AREA CLINIC 115 | Facility: CLINIC | Age: 84
End: 2024-02-06

## 2024-04-11 ENCOUNTER — LAB (OUTPATIENT)
Dept: URBAN - METROPOLITAN AREA CLINIC 115 | Facility: CLINIC | Age: 84
End: 2024-04-11

## 2024-04-11 ENCOUNTER — OV EP (OUTPATIENT)
Dept: URBAN - METROPOLITAN AREA CLINIC 115 | Facility: CLINIC | Age: 84
End: 2024-04-11
Payer: COMMERCIAL

## 2024-04-11 VITALS
SYSTOLIC BLOOD PRESSURE: 141 MMHG | TEMPERATURE: 98 F | WEIGHT: 142 LBS | BODY MASS INDEX: 24.24 KG/M2 | HEART RATE: 100 BPM | DIASTOLIC BLOOD PRESSURE: 86 MMHG | HEIGHT: 64 IN

## 2024-04-11 DIAGNOSIS — R10.84 GENERALIZED ABDOMINAL PAIN: ICD-10-CM

## 2024-04-11 DIAGNOSIS — R74.8 ELEVATED LIVER ENZYMES: ICD-10-CM

## 2024-04-11 PROCEDURE — 99214 OFFICE O/P EST MOD 30 MIN: CPT | Performed by: INTERNAL MEDICINE

## 2024-04-11 RX ORDER — TAMSULOSIN HYDROCHLORIDE 0.4 MG/1
CAPSULE ORAL
Qty: 90 CAPSULE | Status: ACTIVE | COMMUNITY

## 2024-04-11 RX ORDER — PANTOPRAZOLE SODIUM 20 MG/1
1 TABLET TABLET, DELAYED RELEASE ORAL ONCE A DAY
Status: ON HOLD | COMMUNITY

## 2024-04-11 RX ORDER — ASCORBIC ACID 100 MG
1 TABLET TABLET,CHEWABLE ORAL ONCE A DAY
Status: ACTIVE | COMMUNITY

## 2024-04-11 RX ORDER — LOSARTAN POTASSIUM 100 MG/1
1 TABLET TABLET ORAL ONCE A DAY
Status: ON HOLD | COMMUNITY

## 2024-04-11 RX ORDER — FAMOTIDINE 40 MG/1
1 TAB TWICE DAILY TABLET, FILM COATED ORAL
Qty: 180 | Refills: 0 | Status: ON HOLD | COMMUNITY

## 2024-04-11 RX ORDER — ATORVASTATIN CALCIUM 10 MG/1
1 TABLET TABLET, FILM COATED ORAL ONCE A DAY
Status: ACTIVE | COMMUNITY

## 2024-04-11 RX ORDER — AMLODIPINE BESYLATE 5 MG/1
1 TABLET TABLET ORAL ONCE A DAY
Status: ON HOLD | COMMUNITY

## 2024-04-11 RX ORDER — FUROSEMIDE 20 MG/1
TAKE 1 TABLET BY ORAL ROUTE AS NEEDED WHEN IT SWELLING TABLET ORAL
Qty: 30 EACH | Refills: 0 | Status: ACTIVE | COMMUNITY

## 2024-04-11 NOTE — HPI-TODAY'S VISIT:
Bleching, on pepcid daily. No abd pain. Labs reviewed, low iron, slight anemia, take otc iron tab once daily for 2 months then recheck labs. slight elevation in pancreas enzyme lipase. MRI/MRCP '22 w normal liver, bile ducts, pancreas, and GB adenomyomatosis. Pt of Dr. Saavedra. Prior hx: EGD '21 w gastric erosions, gastropathy, no hp. reports hx chronic elevated lipase.bili 1.4, lipase 115, hgb 11.9, mcv 91. No epig pain. Lost ~5lb. concerned re labs.  4/11/2024 Patient presents for elevated liver enzymes and abdominal pain. He denies of any fever and chills, but does complain of nausea. MRI two years ago showed adenomyomatosis.

## 2024-04-13 LAB
A/G RATIO: 1.4
ABSOLUTE BASOPHILS: 20
ABSOLUTE EOSINOPHILS: 32
ABSOLUTE LYMPHOCYTES: 968
ABSOLUTE MONOCYTES: 324
ABSOLUTE NEUTROPHILS: 2656
ALBUMIN: 4.2
ALKALINE PHOSPHATASE: 173
ALT (SGPT): 33
AST (SGOT): 37
BASOPHILS: 0.5
BILIRUBIN, TOTAL: 1.6
BUN/CREATININE RATIO: (no result)
BUN: 21
CALCIUM: 9.6
CARBON DIOXIDE, TOTAL: 27
CHLORIDE: 105
CREATININE: 1.22
EGFR: 58
EOSINOPHILS: 0.8
GLOBULIN, TOTAL: 3
GLUCOSE: 88
HEMATOCRIT: 38.4
HEMOGLOBIN: 12.3
LIPASE: 142
LYMPHOCYTES: 24.2
MCH: 29.7
MCHC: 32
MCV: 92.8
MONOCYTES: 8.1
MPV: 10.9
NEUTROPHILS: 66.4
PLATELET COUNT: 147
POTASSIUM: 4.3
PROTEIN, TOTAL: 7.2
RDW: 11.8
RED BLOOD CELL COUNT: 4.14
SODIUM: 142
WHITE BLOOD CELL COUNT: 4

## 2024-04-23 ENCOUNTER — ERCP (OUTPATIENT)
Dept: URBAN - METROPOLITAN AREA MEDICAL CENTER 24 | Facility: MEDICAL CENTER | Age: 84
End: 2024-04-23

## 2024-04-23 RX ORDER — TAMSULOSIN HYDROCHLORIDE 0.4 MG/1
CAPSULE ORAL
Qty: 90 CAPSULE | Status: ACTIVE | COMMUNITY

## 2024-04-23 RX ORDER — LOSARTAN POTASSIUM 100 MG/1
1 TABLET TABLET ORAL ONCE A DAY
Status: ON HOLD | COMMUNITY

## 2024-04-23 RX ORDER — FUROSEMIDE 20 MG/1
TAKE 1 TABLET BY ORAL ROUTE AS NEEDED WHEN IT SWELLING TABLET ORAL
Qty: 30 EACH | Refills: 0 | Status: ACTIVE | COMMUNITY

## 2024-04-23 RX ORDER — ASCORBIC ACID 100 MG
1 TABLET TABLET,CHEWABLE ORAL ONCE A DAY
Status: ACTIVE | COMMUNITY

## 2024-04-23 RX ORDER — ATORVASTATIN CALCIUM 10 MG/1
1 TABLET TABLET, FILM COATED ORAL ONCE A DAY
Status: ACTIVE | COMMUNITY

## 2024-04-23 RX ORDER — PANTOPRAZOLE SODIUM 20 MG/1
1 TABLET TABLET, DELAYED RELEASE ORAL ONCE A DAY
Status: ON HOLD | COMMUNITY

## 2024-04-23 RX ORDER — AMLODIPINE BESYLATE 5 MG/1
1 TABLET TABLET ORAL ONCE A DAY
Status: ON HOLD | COMMUNITY

## 2024-04-23 RX ORDER — FAMOTIDINE 40 MG/1
1 TAB TWICE DAILY TABLET, FILM COATED ORAL
Qty: 180 | Refills: 0 | Status: ON HOLD | COMMUNITY

## 2024-05-09 ENCOUNTER — OFFICE VISIT (OUTPATIENT)
Dept: URBAN - METROPOLITAN AREA CLINIC 115 | Facility: CLINIC | Age: 84
End: 2024-05-09
Payer: COMMERCIAL

## 2024-05-09 ENCOUNTER — LAB OUTSIDE AN ENCOUNTER (OUTPATIENT)
Dept: URBAN - METROPOLITAN AREA CLINIC 115 | Facility: CLINIC | Age: 84
End: 2024-05-09

## 2024-05-09 ENCOUNTER — DASHBOARD ENCOUNTERS (OUTPATIENT)
Age: 84
End: 2024-05-09

## 2024-05-09 VITALS
BODY MASS INDEX: 23.12 KG/M2 | HEIGHT: 64 IN | WEIGHT: 135.4 LBS | TEMPERATURE: 98 F | HEART RATE: 103 BPM | SYSTOLIC BLOOD PRESSURE: 134 MMHG | DIASTOLIC BLOOD PRESSURE: 79 MMHG

## 2024-05-09 DIAGNOSIS — R74.8 ELEVATED LIVER ENZYMES: ICD-10-CM

## 2024-05-09 DIAGNOSIS — R10.84 GENERALIZED ABDOMINAL PAIN: ICD-10-CM

## 2024-05-09 PROCEDURE — 99214 OFFICE O/P EST MOD 30 MIN: CPT | Performed by: INTERNAL MEDICINE

## 2024-05-09 RX ORDER — LOSARTAN POTASSIUM 100 MG/1
1 TABLET TABLET ORAL ONCE A DAY
Status: ON HOLD | COMMUNITY

## 2024-05-09 RX ORDER — FAMOTIDINE 40 MG/1
1 TAB TWICE DAILY TABLET, FILM COATED ORAL
Qty: 180 | Refills: 0 | Status: ON HOLD | COMMUNITY

## 2024-05-09 RX ORDER — PANTOPRAZOLE SODIUM 20 MG/1
1 TABLET TABLET, DELAYED RELEASE ORAL ONCE A DAY
Status: ON HOLD | COMMUNITY

## 2024-05-09 RX ORDER — TAMSULOSIN HYDROCHLORIDE 0.4 MG/1
CAPSULE ORAL
Qty: 90 CAPSULE | Status: ACTIVE | COMMUNITY

## 2024-05-09 RX ORDER — ASCORBIC ACID 100 MG
1 TABLET TABLET,CHEWABLE ORAL ONCE A DAY
Status: ACTIVE | COMMUNITY

## 2024-05-09 RX ORDER — AMLODIPINE BESYLATE 5 MG/1
1 TABLET TABLET ORAL ONCE A DAY
Status: ON HOLD | COMMUNITY

## 2024-05-09 RX ORDER — ATORVASTATIN CALCIUM 10 MG/1
1 TABLET TABLET, FILM COATED ORAL ONCE A DAY
Status: ACTIVE | COMMUNITY

## 2024-05-09 RX ORDER — PANTOPRAZOLE SODIUM 40 MG/1
1 TABLET TABLET, DELAYED RELEASE ORAL ONCE A DAY
Qty: 90 | Refills: 1 | OUTPATIENT
Start: 2024-05-09

## 2024-05-09 RX ORDER — FUROSEMIDE 20 MG/1
TAKE 1 TABLET BY ORAL ROUTE AS NEEDED WHEN IT SWELLING TABLET ORAL
Qty: 30 EACH | Refills: 0 | Status: ACTIVE | COMMUNITY

## 2024-05-11 LAB
A/G RATIO: 1.3
ABSOLUTE BASOPHILS: 30
ABSOLUTE EOSINOPHILS: 49
ABSOLUTE LYMPHOCYTES: 874
ABSOLUTE MONOCYTES: 258
ABSOLUTE NEUTROPHILS: 2588
ALBUMIN: 4.2
ALKALINE PHOSPHATASE: 153
ALT (SGPT): 28
AST (SGOT): 32
BASOPHILS: 0.8
BILIRUBIN, TOTAL: 1.2
BUN/CREATININE RATIO: (no result)
BUN: 23
CALCIUM: 9.7
CARBON DIOXIDE, TOTAL: 25
CHLORIDE: 107
CREATININE: 1.18
EGFR: 61
EOSINOPHILS: 1.3
GLOBULIN, TOTAL: 3.2
GLUCOSE: 103
HEMATOCRIT: 37
HEMOGLOBIN: 12.3
LIPASE: 105
LYMPHOCYTES: 23
MCH: 30.1
MCHC: 33.2
MCV: 90.7
MONOCYTES: 6.8
MPV: 10.8
NEUTROPHILS: 68.1
PLATELET COUNT: 179
POTASSIUM: 4.5
PROTEIN, TOTAL: 7.4
RDW: 11.4
RED BLOOD CELL COUNT: 4.08
SODIUM: 144
WHITE BLOOD CELL COUNT: 3.8

## 2024-05-22 ENCOUNTER — TELEPHONE ENCOUNTER (OUTPATIENT)
Dept: URBAN - METROPOLITAN AREA CLINIC 92 | Facility: CLINIC | Age: 84
End: 2024-05-22

## 2024-05-22 PROBLEM — 20824003: Status: ACTIVE | Noted: 2024-05-22

## 2024-05-28 ENCOUNTER — OFFICE VISIT (OUTPATIENT)
Dept: URBAN - METROPOLITAN AREA MEDICAL CENTER 24 | Facility: MEDICAL CENTER | Age: 84
End: 2024-05-28

## 2024-05-28 ENCOUNTER — TELEPHONE ENCOUNTER (OUTPATIENT)
Dept: URBAN - METROPOLITAN AREA CLINIC 23 | Facility: CLINIC | Age: 84
End: 2024-05-28

## 2024-06-13 ENCOUNTER — OFFICE VISIT (OUTPATIENT)
Dept: URBAN - METROPOLITAN AREA CLINIC 115 | Facility: CLINIC | Age: 84
End: 2024-06-13
Payer: COMMERCIAL

## 2024-06-13 VITALS
HEIGHT: 64 IN | BODY MASS INDEX: 24.17 KG/M2 | RESPIRATION RATE: 16 BRPM | DIASTOLIC BLOOD PRESSURE: 74 MMHG | WEIGHT: 141.6 LBS | HEART RATE: 94 BPM | SYSTOLIC BLOOD PRESSURE: 128 MMHG | TEMPERATURE: 97.8 F

## 2024-06-13 DIAGNOSIS — R74.8 ELEVATED LIVER ENZYMES: ICD-10-CM

## 2024-06-13 DIAGNOSIS — R93.89 ABNORMAL CT SCAN: ICD-10-CM

## 2024-06-13 DIAGNOSIS — R10.84 GENERALIZED ABDOMINAL PAIN: ICD-10-CM

## 2024-06-13 PROBLEM — 129679001: Status: ACTIVE | Noted: 2024-06-13

## 2024-06-13 PROCEDURE — 99214 OFFICE O/P EST MOD 30 MIN: CPT | Performed by: INTERNAL MEDICINE

## 2024-06-13 RX ORDER — ASCORBIC ACID 100 MG
1 TABLET TABLET,CHEWABLE ORAL ONCE A DAY
Status: ACTIVE | COMMUNITY

## 2024-06-13 RX ORDER — TAMSULOSIN HYDROCHLORIDE 0.4 MG/1
CAPSULE ORAL
Qty: 90 CAPSULE | Status: ACTIVE | COMMUNITY

## 2024-06-13 RX ORDER — PANTOPRAZOLE SODIUM 40 MG/1
1 TABLET TABLET, DELAYED RELEASE ORAL ONCE A DAY
Qty: 90 | Refills: 1 | Status: ACTIVE | COMMUNITY
Start: 2024-05-09

## 2024-06-13 RX ORDER — FUROSEMIDE 20 MG/1
TAKE 1 TABLET BY ORAL ROUTE AS NEEDED WHEN IT SWELLING TABLET ORAL
Qty: 30 EACH | Refills: 0 | Status: ACTIVE | COMMUNITY

## 2024-06-13 RX ORDER — FAMOTIDINE 40 MG/1
1 TAB TWICE DAILY TABLET, FILM COATED ORAL
Qty: 180 | Refills: 0 | Status: ON HOLD | COMMUNITY

## 2024-06-13 RX ORDER — PANTOPRAZOLE SODIUM 20 MG/1
1 TABLET TABLET, DELAYED RELEASE ORAL ONCE A DAY
Status: ON HOLD | COMMUNITY

## 2024-06-13 RX ORDER — ATORVASTATIN CALCIUM 10 MG/1
1 TABLET TABLET, FILM COATED ORAL ONCE A DAY
Status: ACTIVE | COMMUNITY

## 2024-06-13 RX ORDER — AMLODIPINE BESYLATE 5 MG/1
1 TABLET TABLET ORAL ONCE A DAY
Status: ON HOLD | COMMUNITY

## 2024-06-13 RX ORDER — LOSARTAN POTASSIUM 100 MG/1
1 TABLET TABLET ORAL ONCE A DAY
Status: ON HOLD | COMMUNITY

## 2024-06-13 NOTE — HPI-TODAY'S VISIT:
Bleching, on pepcid daily. No abd pain. Labs reviewed, low iron, slight anemia, take otc iron tab once daily for 2 months then recheck labs. slight elevation in pancreas enzyme lipase. MRI/MRCP '22 w normal liver, bile ducts, pancreas, and GB adenomyomatosis. Pt of Dr. Saavedra. Prior hx: EGD '21 w gastric erosions, gastropathy, no hp. reports hx chronic elevated lipase.bili 1.4, lipase 115, hgb 11.9, mcv 91. No epig pain. Lost ~5lb. concerned re labs.  4/11/2024 Patient presents for elevated liver enzymes and abdominal pain. He denies of any fever and chills, but does complain of nausea. MRI two years ago showed adenomyomatosis.  5/9/2024 Patient presents for a follow up. Patient states that he has not been feeling well. He states that he has been having severe lower abdominal pain which radiates to the top. He denies constipation. Patient went to see a cardiologist and states that he was told that he had fluid in his lungs, abdomen, and legs. He was given lasix for it.

## 2024-06-13 NOTE — HPI-TODAY'S VISIT:
6/13/2024 Patient came for follow-up patient says he is still having occasional pain in the right upper quadrant but much improved from before after ERCP and sphincterotomy and balloon sweep.  Denies of any fever denies of any chills does complain of itching in the legs no loss of weight no loss of appetite no acid reflux.

## 2024-06-15 LAB
A/G RATIO: 1.3
ABSOLUTE BASOPHILS: 19
ABSOLUTE EOSINOPHILS: 40
ABSOLUTE LYMPHOCYTES: 676
ABSOLUTE MONOCYTES: 251
ABSOLUTE NEUTROPHILS: 2114
ALBUMIN: 4.1
ALKALINE PHOSPHATASE: 139
ALT (SGPT): 27
AST (SGOT): 32
BASOPHILS: 0.6
BILIRUBIN, TOTAL: 1.3
BUN/CREATININE RATIO: 13
BUN: 17
CALCIUM: 9.3
CARBON DIOXIDE, TOTAL: 26
CHLORIDE: 107
CREATININE: 1.28
EGFR: 55
EOSINOPHILS: 1.3
GLOBULIN, TOTAL: 3.1
GLUCOSE: 97
HEMATOCRIT: 38.5
HEMOGLOBIN: 12.7
INR: 1.1
LIPASE: 83
LYMPHOCYTES: 21.8
MCH: 31.3
MCHC: 33
MCV: 94.8
MONOCYTES: 8.1
MPV: 10.6
NEUTROPHILS: 68.2
PLATELET COUNT: 134
POTASSIUM: 4.6
PROTEIN, TOTAL: 7.2
PT: 11.5
RDW: 11.4
RED BLOOD CELL COUNT: 4.06
SODIUM: 141
WHITE BLOOD CELL COUNT: 3.1

## 2024-06-18 ENCOUNTER — TELEPHONE ENCOUNTER (OUTPATIENT)
Dept: URBAN - METROPOLITAN AREA CLINIC 115 | Facility: CLINIC | Age: 84
End: 2024-06-18